# Patient Record
Sex: FEMALE | Race: BLACK OR AFRICAN AMERICAN | URBAN - METROPOLITAN AREA
[De-identification: names, ages, dates, MRNs, and addresses within clinical notes are randomized per-mention and may not be internally consistent; named-entity substitution may affect disease eponyms.]

---
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
## 2021-01-01 NOTE — ED PROVIDER NOTE - NSFOLLOWUPINSTRUCTIONS_ED_ALL_ED_FT
(1) body pink, extremities blue
WHAT YOU NEED TO KNOW:    Genital herpes is a sexually transmitted infection (STI) that is caused by the herpes simplex virus (HSV). It is spread through oral, vaginal, or anal sex. It may be spread even if you do not see blisters. It can also be spread to other areas of your body, including your eyes, by touching open blisters. If you are pregnant, it may be spread to your baby while he is still in your womb or during vaginal delivery. Unprotected sex or sex with multiple partners increases your risk for genital herpes.    DISCHARGE INSTRUCTIONS:    Call 911 for any of the following:     You have trouble breathing.      You have a seizure.      Your neck is stiff.      You have trouble thinking clearly.    Contact your healthcare provider if:     You have chills or a fever.      You have painful blisters on your penis, vagina, anus, or mouth.      Fluid or blood is coming out of your genitals.      You have trouble urinating.      You think you are pregnant and you are bleeding from your vagina.      You have trouble chewing or swallowing.      Your symptoms do not get better, or they get worse, even after treatment.      You have questions or concerns about your condition or care.    Medicines:     Antivirals may help decrease your symptoms.       Numbing cream or ointment may help decrease pain.      NSAIDs, such as ibuprofen, help decrease swelling, pain, and fever. This medicine is available with or without a doctor's order. NSAIDs can cause stomach bleeding or kidney problems in certain people. If you take blood thinner medicine, always ask your healthcare provider if NSAIDs are safe for you. Always read the medicine label and follow directions.      Take your medicine as directed. Contact your healthcare provider if you think your medicine is not helping or if you have side effects. Tell him or her if you are allergic to any medicine. Keep a list of the medicines, vitamins, and herbs you take. Include the amounts, and when and why you take them. Bring the list or the pill bottles to follow-up visits. Carry your medicine list with you in case of an emergency.    Manage your symptoms: Do the following to be more comfortable when your infection is active:     Keep the blisters clean and dry. Wash them with soap and warm water, and pat dry gently.      Wear cotton underwear and loose clothing. This may help to keep the blisters dry and keep clothes from rubbing.      Apply ice on the area for 15 to 20 minutes every hour or as directed. Use an ice pack, or put crushed ice in a plastic bag. Cover it with a towel. Ice helps prevent tissue damage and decreases swelling and pain.      Apply heat on the area for 20 to 30 minutes every 2 hours for as many days as directed. A warm bath may also help. Heat helps decrease pain and muscle spasms.    Prevent the spread of genital herpes:     Use condoms. Use a latex condom when you have oral, genital, and anal sex. Use a new condom each time. Use a polyurethane condom if you are allergic to latex.      Try not to touch your blisters. Wash your hands before and after you touch the area. Do not kiss anyone if you have blisters around your mouth. Do not breastfeed if you have blisters on your breast.       Tell your partners that you have genital herpes. Do not have sex until he or she knows that you have genital herpes. Ask your healthcare provider for ways to tell partners about your infection.      Tell your healthcare providers that you have genital herpes. If you are pregnant, your baby may need special monitoring. Inform your healthcare provider of your condition to avoid spreading the infection to your baby.    Follow up with your healthcare provider as directed: Write down your questions so you remember to ask them during your visits.